# Patient Record
Sex: MALE | Race: WHITE | Employment: OTHER | URBAN - METROPOLITAN AREA
[De-identification: names, ages, dates, MRNs, and addresses within clinical notes are randomized per-mention and may not be internally consistent; named-entity substitution may affect disease eponyms.]

---

## 2022-09-06 ENCOUNTER — APPOINTMENT (OUTPATIENT)
Dept: GENERAL RADIOLOGY | Age: 82
End: 2022-09-06
Payer: OTHER GOVERNMENT

## 2022-09-06 ENCOUNTER — APPOINTMENT (OUTPATIENT)
Dept: CT IMAGING | Age: 82
End: 2022-09-06
Payer: OTHER GOVERNMENT

## 2022-09-06 ENCOUNTER — HOSPITAL ENCOUNTER (EMERGENCY)
Age: 82
Discharge: HOME | End: 2022-09-07
Attending: EMERGENCY MEDICINE
Payer: OTHER GOVERNMENT

## 2022-09-06 DIAGNOSIS — U07.1 COVID-19: ICD-10-CM

## 2022-09-06 DIAGNOSIS — R53.1 GENERALIZED WEAKNESS: Primary | ICD-10-CM

## 2022-09-06 LAB
ALBUMIN SERPL-MCNC: 3.5 G/DL (ref 3.5–5.2)
ALP BLD-CCNC: 77 U/L (ref 40–129)
ALT SERPL-CCNC: 11 U/L (ref 0–40)
ANION GAP SERPL CALCULATED.3IONS-SCNC: 11 MMOL/L (ref 7–16)
AST SERPL-CCNC: 24 U/L (ref 0–39)
BASOPHILS ABSOLUTE: 0.04 E9/L (ref 0–0.2)
BASOPHILS RELATIVE PERCENT: 0.8 % (ref 0–2)
BILIRUB SERPL-MCNC: 0.4 MG/DL (ref 0–1.2)
BUN BLDV-MCNC: 15 MG/DL (ref 6–23)
CALCIUM SERPL-MCNC: 9.1 MG/DL (ref 8.6–10.2)
CHLORIDE BLD-SCNC: 101 MMOL/L (ref 98–107)
CO2: 26 MMOL/L (ref 22–29)
CREAT SERPL-MCNC: 1.1 MG/DL (ref 0.7–1.2)
D DIMER: 256 NG/ML DDU
EOSINOPHILS ABSOLUTE: 0.01 E9/L (ref 0.05–0.5)
EOSINOPHILS RELATIVE PERCENT: 0.2 % (ref 0–6)
GFR AFRICAN AMERICAN: >60
GFR NON-AFRICAN AMERICAN: >60 ML/MIN/1.73
GLUCOSE BLD-MCNC: 125 MG/DL (ref 74–99)
HCT VFR BLD CALC: 49.3 % (ref 37–54)
HEMOGLOBIN: 16.5 G/DL (ref 12.5–16.5)
IMMATURE GRANULOCYTES #: 0.01 E9/L
IMMATURE GRANULOCYTES %: 0.2 % (ref 0–5)
LIPASE: 34 U/L (ref 13–60)
LYMPHOCYTES ABSOLUTE: 1.33 E9/L (ref 1.5–4)
LYMPHOCYTES RELATIVE PERCENT: 25.9 % (ref 20–42)
MCH RBC QN AUTO: 29.7 PG (ref 26–35)
MCHC RBC AUTO-ENTMCNC: 33.5 % (ref 32–34.5)
MCV RBC AUTO: 88.8 FL (ref 80–99.9)
MONOCYTES ABSOLUTE: 1 E9/L (ref 0.1–0.95)
MONOCYTES RELATIVE PERCENT: 19.5 % (ref 2–12)
NEUTROPHILS ABSOLUTE: 2.75 E9/L (ref 1.8–7.3)
NEUTROPHILS RELATIVE PERCENT: 53.4 % (ref 43–80)
PDW BLD-RTO: 14.7 FL (ref 11.5–15)
PLATELET # BLD: 129 E9/L (ref 130–450)
PMV BLD AUTO: 11.1 FL (ref 7–12)
POTASSIUM REFLEX MAGNESIUM: 3.8 MMOL/L (ref 3.5–5)
RBC # BLD: 5.55 E12/L (ref 3.8–5.8)
SARS-COV-2, NAAT: DETECTED
SODIUM BLD-SCNC: 138 MMOL/L (ref 132–146)
TOTAL PROTEIN: 6.7 G/DL (ref 6.4–8.3)
TROPONIN, HIGH SENSITIVITY: 16 NG/L (ref 0–11)
TROPONIN, HIGH SENSITIVITY: 17 NG/L (ref 0–11)
WBC # BLD: 5.1 E9/L (ref 4.5–11.5)

## 2022-09-06 PROCEDURE — 80053 COMPREHEN METABOLIC PANEL: CPT

## 2022-09-06 PROCEDURE — 96360 HYDRATION IV INFUSION INIT: CPT

## 2022-09-06 PROCEDURE — 99285 EMERGENCY DEPT VISIT HI MDM: CPT

## 2022-09-06 PROCEDURE — 2580000003 HC RX 258: Performed by: EMERGENCY MEDICINE

## 2022-09-06 PROCEDURE — 71045 X-RAY EXAM CHEST 1 VIEW: CPT

## 2022-09-06 PROCEDURE — 71275 CT ANGIOGRAPHY CHEST: CPT

## 2022-09-06 PROCEDURE — 87635 SARS-COV-2 COVID-19 AMP PRB: CPT

## 2022-09-06 PROCEDURE — 85378 FIBRIN DEGRADE SEMIQUANT: CPT

## 2022-09-06 PROCEDURE — 84484 ASSAY OF TROPONIN QUANT: CPT

## 2022-09-06 PROCEDURE — 93005 ELECTROCARDIOGRAM TRACING: CPT | Performed by: EMERGENCY MEDICINE

## 2022-09-06 PROCEDURE — 6360000004 HC RX CONTRAST MEDICATION: Performed by: RADIOLOGY

## 2022-09-06 PROCEDURE — 83690 ASSAY OF LIPASE: CPT

## 2022-09-06 PROCEDURE — 85025 COMPLETE CBC W/AUTO DIFF WBC: CPT

## 2022-09-06 PROCEDURE — 96361 HYDRATE IV INFUSION ADD-ON: CPT

## 2022-09-06 RX ORDER — CALCIUM CARBONATE 200(500)MG
500 TABLET,CHEWABLE ORAL 3 TIMES DAILY PRN
Status: DISCONTINUED | OUTPATIENT
Start: 2022-09-06 | End: 2022-09-07 | Stop reason: HOSPADM

## 2022-09-06 RX ORDER — 0.9 % SODIUM CHLORIDE 0.9 %
1000 INTRAVENOUS SOLUTION INTRAVENOUS ONCE
Status: COMPLETED | OUTPATIENT
Start: 2022-09-06 | End: 2022-09-06

## 2022-09-06 RX ADMIN — IOPAMIDOL 75 ML: 755 INJECTION, SOLUTION INTRAVENOUS at 22:29

## 2022-09-06 RX ADMIN — SODIUM CHLORIDE 1000 ML: 9 INJECTION, SOLUTION INTRAVENOUS at 21:11

## 2022-09-06 RX ADMIN — SODIUM CHLORIDE 1000 ML: 9 INJECTION, SOLUTION INTRAVENOUS at 22:56

## 2022-09-06 ASSESSMENT — PAIN - FUNCTIONAL ASSESSMENT: PAIN_FUNCTIONAL_ASSESSMENT: NONE - DENIES PAIN

## 2022-09-07 VITALS
DIASTOLIC BLOOD PRESSURE: 78 MMHG | WEIGHT: 212 LBS | HEART RATE: 72 BPM | HEIGHT: 71 IN | TEMPERATURE: 98.4 F | RESPIRATION RATE: 18 BRPM | OXYGEN SATURATION: 94 % | BODY MASS INDEX: 29.68 KG/M2 | SYSTOLIC BLOOD PRESSURE: 128 MMHG

## 2022-09-07 LAB
EKG ATRIAL RATE: 72 BPM
EKG P AXIS: 64 DEGREES
EKG P-R INTERVAL: 198 MS
EKG Q-T INTERVAL: 388 MS
EKG QRS DURATION: 78 MS
EKG QTC CALCULATION (BAZETT): 424 MS
EKG R AXIS: 33 DEGREES
EKG T AXIS: 52 DEGREES
EKG VENTRICULAR RATE: 72 BPM

## 2022-09-07 PROCEDURE — 6370000000 HC RX 637 (ALT 250 FOR IP): Performed by: EMERGENCY MEDICINE

## 2022-09-07 RX ORDER — ASCORBIC ACID 500 MG
500 TABLET ORAL 2 TIMES DAILY
Qty: 14 TABLET | Refills: 0 | Status: SHIPPED | OUTPATIENT
Start: 2022-09-07 | End: 2022-09-14

## 2022-09-07 RX ORDER — ZINC SULFATE 50(220)MG
50 CAPSULE ORAL DAILY
Qty: 7 CAPSULE | Refills: 0 | Status: SHIPPED | OUTPATIENT
Start: 2022-09-07 | End: 2022-09-14

## 2022-09-07 RX ORDER — PANTOPRAZOLE SODIUM 40 MG/1
40 TABLET, DELAYED RELEASE ORAL
Qty: 30 TABLET | Refills: 0 | Status: SHIPPED | OUTPATIENT
Start: 2022-09-07

## 2022-09-07 RX ORDER — DEXTROMETHORPHAN HYDROBROMIDE, GUAIFENESIN 5; 100 MG/5ML; MG/5ML
20 LIQUID ORAL EVERY 4 HOURS PRN
Qty: 118 ML | Refills: 0 | Status: SHIPPED | OUTPATIENT
Start: 2022-09-07 | End: 2022-09-14

## 2022-09-07 RX ADMIN — CALCIUM CARBONATE 500 MG: 500 TABLET, CHEWABLE ORAL at 00:08

## 2022-09-07 NOTE — ED PROVIDER NOTES
HPI:  9/6/22,   Time: 8:31 PM EDT       Catrachita Boles is a 78 y.o. male presenting to the ED for generalized weakness and fatigue, beginning 3 days ago. The complaint has been persistent, moderate in severity, and worsened by nothing. Patient states that since Sunday he has not been feeling well. He states he has been having generalized weakness body aches as well as feeling lightheaded and dizzy. He states that today took a COVID test that was positive. He states he has had increasing shortness of breath therefore he came to the ED to be evaluated. No chest pain. No nausea vomiting diarrhea. No numbness tingling. No focal deficits. No headaches or vision changes. Review of Systems:   Pertinent positives and negatives are stated within HPI, all other systems reviewed and are negative.          --------------------------------------------- PAST HISTORY ---------------------------------------------  Past Medical History:  has no past medical history on file. Past Surgical History:  has no past surgical history on file. Social History:      Family History: family history is not on file. The patients home medications have been reviewed. Allergies: Patient has no known allergies. ---------------------------------------------------PHYSICAL EXAM--------------------------------------    Constitutional/General: Alert and oriented x3, well appearing, non toxic in NAD  Head: Normocephalic and atraumatic  Eyes: PERRL, EOMI, conjunctive normal, sclera non icteric  Mouth: Oropharynx clear, handling secretions, no trismus, no asymmetry of the posterior oropharynx or uvular edema  Neck: Supple, full ROM, non tender to palpation in the midline, no stridor, no crepitus, no meningeal signs  Respiratory: Lungs clear to auscultation bilaterally, no wheezes, rales, or rhonchi. Not in respiratory distress  Cardiovascular:  Regular rate. Regular rhythm. No murmurs, gallops, or rubs.  2+ distal pulses  GI: Abdomen Soft, Non tender, Non distended. +BS. No organomegaly, no palpable masses,  No rebound, guarding, or rigidity. Musculoskeletal: Moves all extremities x 4. Warm and well perfused, no clubbing, cyanosis, or edema. Capillary refill <3 seconds  Integument: skin warm and dry. No rashes. Neurologic: GCS 15, no focal deficits, symmetric strength 5/5 in the upper and lower extremities bilaterally, sensation intact all 4 extremities, cranial nerves II to XII intact, no ataxia  Psychiatric: Normal Affect    -------------------------------------------------- RESULTS -------------------------------------------------  I have personally reviewed all laboratory and imaging results for this patient. Results are listed below.      LABS:  Results for orders placed or performed during the hospital encounter of 09/06/22   COVID-19, Rapid    Specimen: Nasopharyngeal Swab   Result Value Ref Range    SARS-CoV-2, NAAT DETECTED (A) Not Detected   CBC with Auto Differential   Result Value Ref Range    WBC 5.1 4.5 - 11.5 E9/L    RBC 5.55 3.80 - 5.80 E12/L    Hemoglobin 16.5 12.5 - 16.5 g/dL    Hematocrit 49.3 37.0 - 54.0 %    MCV 88.8 80.0 - 99.9 fL    MCH 29.7 26.0 - 35.0 pg    MCHC 33.5 32.0 - 34.5 %    RDW 14.7 11.5 - 15.0 fL    Platelets 949 (L) 123 - 450 E9/L    MPV 11.1 7.0 - 12.0 fL    Neutrophils % 53.4 43.0 - 80.0 %    Immature Granulocytes % 0.2 0.0 - 5.0 %    Lymphocytes % 25.9 20.0 - 42.0 %    Monocytes % 19.5 (H) 2.0 - 12.0 %    Eosinophils % 0.2 0.0 - 6.0 %    Basophils % 0.8 0.0 - 2.0 %    Neutrophils Absolute 2.75 1.80 - 7.30 E9/L    Immature Granulocytes # 0.01 E9/L    Lymphocytes Absolute 1.33 (L) 1.50 - 4.00 E9/L    Monocytes Absolute 1.00 (H) 0.10 - 0.95 E9/L    Eosinophils Absolute 0.01 (L) 0.05 - 0.50 E9/L    Basophils Absolute 0.04 0.00 - 0.20 E9/L   Comprehensive Metabolic Panel w/ Reflex to MG   Result Value Ref Range    Sodium 138 132 - 146 mmol/L    Potassium reflex Magnesium 3.8 3.5 - 5.0 mmol/L Chloride 101 98 - 107 mmol/L    CO2 26 22 - 29 mmol/L    Anion Gap 11 7 - 16 mmol/L    Glucose 125 (H) 74 - 99 mg/dL    BUN 15 6 - 23 mg/dL    Creatinine 1.1 0.7 - 1.2 mg/dL    GFR Non-African American >60 >=60 mL/min/1.73    GFR African American >60     Calcium 9.1 8.6 - 10.2 mg/dL    Total Protein 6.7 6.4 - 8.3 g/dL    Albumin 3.5 3.5 - 5.2 g/dL    Total Bilirubin 0.4 0.0 - 1.2 mg/dL    Alkaline Phosphatase 77 40 - 129 U/L    ALT 11 0 - 40 U/L    AST 24 0 - 39 U/L   Lipase   Result Value Ref Range    Lipase 34 13 - 60 U/L   Troponin   Result Value Ref Range    Troponin, High Sensitivity 17 (H) 0 - 11 ng/L   D-Dimer, Quantitative   Result Value Ref Range    D-Dimer, Quant 256 ng/mL DDU   Troponin   Result Value Ref Range    Troponin, High Sensitivity 16 (H) 0 - 11 ng/L   EKG 12 Lead   Result Value Ref Range    Ventricular Rate 72 BPM    Atrial Rate 72 BPM    P-R Interval 198 ms    QRS Duration 78 ms    Q-T Interval 388 ms    QTc Calculation (Bazett) 424 ms    P Axis 64 degrees    R Axis 33 degrees    T Axis 52 degrees       RADIOLOGY:  Interpreted by Radiologist.  CTA PULMONARY W CONTRAST   Final Result   No evidence of pulmonary embolism or acute pulmonary abnormality. Large hiatal hernia extending into the left hemithorax. RECOMMENDATIONS:   Unavailable         XR CHEST PORTABLE   Final Result   No acute process. EKG:  This EKG is signed and interpreted by the EP. EKG shows sinus rhythm with frequent PVCs 72 bpm.  Low voltage QRS. Normal axis. Nonspecific ST-T wave changes. No STEMI.    ------------------------- NURSING NOTES AND VITALS REVIEWED ---------------------------   The nursing notes within the ED encounter and vital signs as below have been reviewed by myself.   /80   Pulse 74   Temp 98.4 °F (36.9 °C) (Oral)   Resp 16   Ht 5' 11\" (1.803 m)   Wt 212 lb (96.2 kg)   SpO2 95%   BMI 29.57 kg/m²   Oxygen Saturation Interpretation: Normal    The patients available past medical records and past encounters were reviewed. ------------------------------ ED COURSE/MEDICAL DECISION MAKING----------------------  Medications   calcium carbonate (TUMS) chewable tablet 500 mg (500 mg Oral Given 9/7/22 0008)   0.9 % sodium chloride bolus (0 mLs IntraVENous Stopped 9/6/22 2211)   iopamidol (ISOVUE-370) 76 % injection 75 mL (75 mLs IntraVENous Given 9/6/22 2229)   0.9 % sodium chloride bolus (0 mLs IntraVENous Stopped 9/6/22 2354)         ED COURSE:       Medical Decision Making: This is an 33-year-old male generalized weakness and fatigue. Patient underwent laboratory work-up which showed normal CBC. Normal chemistry. Normal lipase. Troponins were 17 and 16 negative delta. EKG showed nonspecific findings and PVCs noted. COVID test was positive. Patient did have a mild elevation in D-dimer therefore patient underwent CTA. CTA showed no PE. Hiatal hernia noted. Patient made aware of CT findings and incidental findings. Patient given 2 L of IV fluids and on reevaluation patient feeling much better. Vital signs are stable. No hypoxia. Patient ambulated with no hypoxia. Patient GCS 15. Neurologically intact. Appears well. Patient stable for outpatient management. Return precautions given. Patient agrees with plan. I, Dr. Robert Silva, am the primary provider for this encounter    This patient's ED course included: a personal history and physicial examination, re-evaluation prior to disposition, multiple bedside re-evaluations, IV medications, cardiac monitoring, and continuous pulse oximetry    This patient has remained hemodynamically stable during their ED course. Re-Evaluations:             Re-evaluation. Patients symptoms are improving      Counseling: The emergency provider has spoken with the patient and discussed todays results, in addition to providing specific details for the plan of care and counseling regarding the diagnosis and prognosis. Questions are answered at this time and they are agreeable with the plan.       --------------------------------- IMPRESSION AND DISPOSITION ---------------------------------    IMPRESSION  1. Generalized weakness    2. COVID-19        DISPOSITION  Disposition: Discharge to home  Patient condition is stable    NOTE: This report was transcribed using voice recognition software.  Every effort was made to ensure accuracy; however, inadvertent computerized transcription errors may be present        Stephan rPice DO  09/07/22 [id number]

## 2022-09-08 ENCOUNTER — CARE COORDINATION (OUTPATIENT)
Dept: CARE COORDINATION | Age: 82
End: 2022-09-08

## 2022-09-08 NOTE — CARE COORDINATION
Patient contacted regarding COVID-19 diagnosis and pulse oximeter ordered at discharge. Discussed COVID-19 related testing which was available at this time. Test results were positive. Patient informed of results, if available? Yes. Ambulatory Care Manager contacted the patient by telephone to perform post discharge assessment. Call within 2 business days of discharge: Yes. Verified name and  with patient as identifiers. Provided introduction to self, and explanation of the CTN/ACM role, and reason for call due to risk factors for infection and/or exposure to COVID-19. Symptoms reviewed with patient who verbalized the following symptoms: fatigue  cough  no new symptoms  no worsening symptoms. Due to no new or worsening symptoms encounter was not routed to provider for escalation. Discussed follow-up appointments. If no appointment was previously scheduled, appointment scheduling offered: Yes. [facility] follow up appointment(s): No future appointments. Non-[facility] follow up appointment(s): Patient states that he will follow up with his provider at [facility]. Non-face-to-face services provided:  Obtained and reviewed discharge summary and/or continuity of care documents     Advance Care Planning:   Does patient have an Advance Directive:  reviewed and current. Educated patient about risk for severe COVID-19 due to risk factors according to CDC guidelines. ACM reviewed discharge instructions, medical action plan and red flag symptoms with the patient who verbalized understanding. Discussed COVID vaccination status: Yes. Education provided on COVID-19 vaccination as appropriate. Discussed exposure protocols and quarantine with CDC Guidelines. Patient was given an opportunity to verbalize any questions and concerns and agrees to contact ACM or health care provider for questions related to their healthcare.     Reviewed and educated patient on any new and changed medications related to discharge diagnosis Was patient discharged with a pulse oximeter? yes, discussed and confirmed pulse oximeter discharge instructions and when to notify provider or seek emergency care. Patient verbalizes understanding on how to properly use the pulse oximeter. Patient took his pulse ox during this phone encounter and reports it to be 96% on room air.    -Patient denies worsening of any symptoms.  -Patient verbalizes understanding of when it would be of emergent need to return to the ED. -Reviewed medications ordered by the ED provider. -ACM emphasized the importance to follow up with PCP. -Patient states that receives care at the [address] [place] [address] and will call to schedule a follow up appointment.  -Patient verbalized understanding of the CDC guidelines.  -Patient declined activation of MyChart. He does not use a computer.  -All questions were answered during this encounter. ACM provided contact information. Plan for follow-up call in 5-7 days based on severity of symptoms and risk factors.

## 2022-09-12 ENCOUNTER — CARE COORDINATION (OUTPATIENT)
Dept: CARE COORDINATION | Age: 82
End: 2022-09-12

## 2022-09-12 NOTE — CARE COORDINATION
Patient contacted regarding COVID-19 diagnosis and pulse oximeter ordered at discharge. Discussed COVID-19 related testing which was available at this time. Test results were positive. Patient informed of results, if available? Yes. Ambulatory Care Manager contacted the patient by telephone to perform post discharge assessment. Call within 2 business days of discharge: Yes. Verified name and  with patient as identifiers. Provided introduction to self, and explanation of the CTN/ACM role, and reason for call due to risk factors for infection and/or exposure to COVID-19. Symptoms reviewed with patient who verbalized the following symptoms: fatigue  pain or aching joints  cough. Due to no new or worsening symptoms encounter was not routed to provider for escalation. Discussed follow-up appointments. If no appointment was previously scheduled, appointment scheduling offered: Yes. [facility] follow up appointment(s): Pt will follow up when applicable  Non-[facility] follow up appointment(s): NA    Non-face-to-face services provided:  Obtained and reviewed discharge summary and/or continuity of care documents     Advance Care Planning:   Does patient have an Advance Directive:  reviewed and current. Educated patient about risk for severe COVID-19 due to risk factors according to CDC guidelines. ACM reviewed discharge instructions, medical action plan and red flag symptoms with the patient who verbalized understanding. Discussed COVID vaccination status: Yes. Education provided on COVID-19 vaccination as appropriate. Discussed exposure protocols and quarantine with CDC Guidelines. Patient was given an opportunity to verbalize any questions and concerns and agrees to contact ACM or health care provider for questions related to their healthcare.     Reviewed and educated patient on any new and changed medications related to discharge diagnosis     Was patient discharged with a pulse oximeter? yes, discussed and confirmed pulse oximeter discharge instructions and when to notify provider or seek emergency care. ACM provided contact information. No further follow-up call identified based on severity of symptoms and risk factors. Patient understands CDC guidelines and is able to repeat them. Patient verbalizes understanding of suggestions for follow up from ED AVS and has number to do so  Patient has no new or worsening symptoms. Patient wishes no further calls at this time from [facility]. Patient has contact information and encouraged to contact ACM should there be any questions or concerns. Episode to be closed at this time.

## 2023-03-07 ENCOUNTER — HOSPITAL ENCOUNTER (EMERGENCY)
Age: 83
Discharge: HOME | End: 2023-03-07
Attending: EMERGENCY MEDICINE
Payer: OTHER GOVERNMENT

## 2023-03-07 ENCOUNTER — HOSPITAL ENCOUNTER (OUTPATIENT)
Dept: ULTRASOUND IMAGING | Age: 83
Discharge: HOME | End: 2023-03-07
Payer: OTHER GOVERNMENT

## 2023-03-07 ENCOUNTER — HOSPITAL ENCOUNTER (OUTPATIENT)
Age: 83
Discharge: HOME | End: 2023-03-07
Payer: OTHER GOVERNMENT

## 2023-03-07 VITALS
RESPIRATION RATE: 18 BRPM | OXYGEN SATURATION: 97 % | SYSTOLIC BLOOD PRESSURE: 130 MMHG | DIASTOLIC BLOOD PRESSURE: 76 MMHG | HEART RATE: 78 BPM | TEMPERATURE: 97.3 F

## 2023-03-07 DIAGNOSIS — I82.4Y9 DEEP VEIN THROMBOSIS (DVT) OF PROXIMAL LOWER EXTREMITY, UNSPECIFIED CHRONICITY, UNSPECIFIED LATERALITY (HCC): ICD-10-CM

## 2023-03-07 DIAGNOSIS — I82.4Y1 DEEP VEIN THROMBOSIS (DVT) OF PROXIMAL VEIN OF RIGHT LOWER EXTREMITY, UNSPECIFIED CHRONICITY (HCC): Primary | ICD-10-CM

## 2023-03-07 DIAGNOSIS — R60.9 SWELLING: ICD-10-CM

## 2023-03-07 LAB
ALBUMIN SERPL-MCNC: 4.5 G/DL (ref 3.5–5.2)
ALP BLD-CCNC: 93 U/L (ref 40–129)
ALT SERPL-CCNC: 10 U/L (ref 0–40)
ANION GAP SERPL CALCULATED.3IONS-SCNC: 15 MMOL/L (ref 7–16)
APTT: 37 SEC (ref 24.5–35.1)
AST SERPL-CCNC: 17 U/L (ref 0–39)
BASOPHILS ABSOLUTE: 0.09 E9/L (ref 0–0.2)
BASOPHILS RELATIVE PERCENT: 1.1 % (ref 0–2)
BILIRUB SERPL-MCNC: 1 MG/DL (ref 0–1.2)
BUN BLDV-MCNC: 12 MG/DL (ref 6–23)
CALCIUM SERPL-MCNC: 10.2 MG/DL (ref 8.6–10.2)
CHLORIDE BLD-SCNC: 102 MMOL/L (ref 98–107)
CO2: 23 MMOL/L (ref 22–29)
CREAT SERPL-MCNC: 1 MG/DL (ref 0.7–1.2)
EOSINOPHILS ABSOLUTE: 0.09 E9/L (ref 0.05–0.5)
EOSINOPHILS RELATIVE PERCENT: 1.1 % (ref 0–6)
GFR SERPL CREATININE-BSD FRML MDRD: >60 ML/MIN/1.73
GLUCOSE BLD-MCNC: 94 MG/DL (ref 74–99)
HCT VFR BLD CALC: 52.6 % (ref 37–54)
HEMOGLOBIN: 17.8 G/DL (ref 12.5–16.5)
IMMATURE GRANULOCYTES #: 0.02 E9/L
IMMATURE GRANULOCYTES %: 0.2 % (ref 0–5)
INR BLD: 1
LYMPHOCYTES ABSOLUTE: 1.68 E9/L (ref 1.5–4)
LYMPHOCYTES RELATIVE PERCENT: 20 % (ref 20–42)
MCH RBC QN AUTO: 29.8 PG (ref 26–35)
MCHC RBC AUTO-ENTMCNC: 33.8 % (ref 32–34.5)
MCV RBC AUTO: 88 FL (ref 80–99.9)
MONOCYTES ABSOLUTE: 0.74 E9/L (ref 0.1–0.95)
MONOCYTES RELATIVE PERCENT: 8.8 % (ref 2–12)
NEUTROPHILS ABSOLUTE: 5.79 E9/L (ref 1.8–7.3)
NEUTROPHILS RELATIVE PERCENT: 68.8 % (ref 43–80)
PDW BLD-RTO: 18.1 FL (ref 11.5–15)
PLATELET # BLD: 189 E9/L (ref 130–450)
PMV BLD AUTO: 11.2 FL (ref 7–12)
POTASSIUM SERPL-SCNC: 4.1 MMOL/L (ref 3.5–5)
PROTHROMBIN TIME: 12 SEC (ref 9.3–12.4)
RBC # BLD: 5.98 E12/L (ref 3.8–5.8)
SODIUM BLD-SCNC: 140 MMOL/L (ref 132–146)
TOTAL PROTEIN: 8 G/DL (ref 6.4–8.3)
WBC # BLD: 8.4 E9/L (ref 4.5–11.5)

## 2023-03-07 PROCEDURE — 99283 EMERGENCY DEPT VISIT LOW MDM: CPT

## 2023-03-07 PROCEDURE — 85610 PROTHROMBIN TIME: CPT

## 2023-03-07 PROCEDURE — 80053 COMPREHEN METABOLIC PANEL: CPT

## 2023-03-07 PROCEDURE — 85025 COMPLETE CBC W/AUTO DIFF WBC: CPT

## 2023-03-07 PROCEDURE — 6370000000 HC RX 637 (ALT 250 FOR IP)

## 2023-03-07 PROCEDURE — 85730 THROMBOPLASTIN TIME PARTIAL: CPT

## 2023-03-07 PROCEDURE — 93971 EXTREMITY STUDY: CPT

## 2023-03-07 RX ADMIN — APIXABAN 10 MG: 5 TABLET, FILM COATED ORAL at 16:44

## 2023-03-07 ASSESSMENT — ENCOUNTER SYMPTOMS
COUGH: 0
SINUS PRESSURE: 0
BLOOD IN STOOL: 0
BACK PAIN: 0
SORE THROAT: 0
EYE DISCHARGE: 0
CONSTIPATION: 0
NAUSEA: 0
VOMITING: 0
DIARRHEA: 0
PHOTOPHOBIA: 0
ABDOMINAL DISTENTION: 0
EYE REDNESS: 0
WHEEZING: 0
RHINORRHEA: 0
SHORTNESS OF BREATH: 0
ABDOMINAL PAIN: 0

## 2023-03-07 ASSESSMENT — PAIN - FUNCTIONAL ASSESSMENT: PAIN_FUNCTIONAL_ASSESSMENT: NONE - DENIES PAIN

## 2023-03-07 NOTE — DISCHARGE INSTRUCTIONS
As we discussed, please take your new medication as it is prescribed. For the next 7 days you will take 2 tablets in the morning and 2 tablets in the evening. Starting Wednesday 3/15/ morning you will start taking 1 tablet in the morning and 1 tablet in the evening. As we discussed it is important that you follow-up with your [address] [place] [address] doctor to follow-up on your recent emergency room visit and to discuss your new medication for further prescriptions. Reasons to return the emergency room would be worsening swelling, severe chest pain, severe shortness of breath, dizziness, head injury, uncontrolled bleeding, or uncontrolled fevers.

## 2023-03-07 NOTE — ED PROVIDER NOTES
[address] [facility]  Department of Emergency Medicine     Written by: Cory Quintero MD  Patient Name: Brenda Hameed  Attending Provider: Micheline Lang DO  Admit Date: 3/7/2023  3:02 PM  MRN: [id number]                   : 1940      Chief Complaint   Patient presents with    Leg Swelling     Pt presents today for right leg swelling. Pt brought over from [address] [facility]. DVT noted in scan. - Chief complaint    HPI   The patient is an 27-year-old male with a past medical history presenting with right lower extremity swelling. Patient states that he has been having right lower extremity swelling for 1 week. He states that socks and pants have felt tighter over his right lower extremity for the same duration. He was evaluated by ultrasound who performed right lower extremity ultrasound which showed evidence of a nonocclusive clot in his right lower extremity and was sent to the emergency room. Patient states that he is unaware of any clotting disorders and has never had any prior blood clots. Review of Systems   Constitutional:  Negative for activity change, chills, fatigue and fever. HENT:  Negative for congestion, postnasal drip, rhinorrhea, sinus pressure and sore throat. Eyes:  Negative for photophobia, discharge, redness and visual disturbance. Respiratory:  Negative for cough, shortness of breath and wheezing. Cardiovascular:  Positive for leg swelling. Negative for chest pain and palpitations. Gastrointestinal:  Negative for abdominal distention, abdominal pain, blood in stool, constipation, diarrhea, nausea and vomiting. Endocrine: Negative for cold intolerance and heat intolerance. Genitourinary:  Negative for decreased urine volume, difficulty urinating, dysuria, flank pain, frequency, hematuria and urgency. Musculoskeletal:  Negative for arthralgias, back pain, joint swelling and myalgias. Skin:  Negative for rash and wound.    Allergic/Immunologic: Negative for immunocompromised state. Neurological:  Negative for dizziness, syncope, facial asymmetry, weakness, light-headedness, numbness and headaches. Hematological:  Does not bruise/bleed easily. Psychiatric/Behavioral:  Negative for behavioral problems and hallucinations. Physical Exam  Constitutional:       General: He is not in acute distress. Appearance: Normal appearance. He is not ill-appearing, toxic-appearing or diaphoretic. HENT:      Head: Normocephalic and atraumatic. Right Ear: Hearing normal.      Left Ear: Hearing normal.      Nose: Nose normal.      Mouth/Throat:      Lips: Pink. Mouth: Mucous membranes are moist.      Pharynx: Oropharynx is clear. Eyes:      Extraocular Movements: Extraocular movements intact. Conjunctiva/sclera: Conjunctivae normal.      Pupils: Pupils are equal, round, and reactive to light. Cardiovascular:      Rate and Rhythm: Normal rate and regular rhythm. Pulses: Normal pulses. Radial pulses are 2+ on the right side and 2+ on the left side. Posterior tibial pulses are 2+ on the right side and 2+ on the left side. Heart sounds: S1 normal and S2 normal.   Pulmonary:      Effort: Pulmonary effort is normal. No tachypnea, accessory muscle usage or respiratory distress. Breath sounds: Normal breath sounds and air entry. No decreased breath sounds, wheezing, rhonchi or rales. Abdominal:      General: Abdomen is flat. Bowel sounds are normal. There is no distension. Palpations: Abdomen is soft. There is no fluid wave or mass. Tenderness: There is no abdominal tenderness. There is no right CVA tenderness, left CVA tenderness or guarding. Musculoskeletal:         General: No swelling or tenderness. Normal range of motion. Cervical back: Normal range of motion and neck supple. No rigidity. Right lower leg: 3+ Pitting Edema present. Left lower leg: No edema.    Lymphadenopathy:      Cervical: No cervical adenopathy. Skin:     General: Skin is warm and dry. Capillary Refill: Capillary refill takes less than 2 seconds. Findings: No bruising, lesion or rash. Neurological:      General: No focal deficit present. Mental Status: He is alert and oriented to person, place, and time. Mental status is at baseline. Motor: No weakness. Psychiatric:         Attention and Perception: Attention normal.         Mood and Affect: Mood and affect normal.         Behavior: Behavior is cooperative. Procedures       MDM  Number of Diagnoses or Management Options  Deep vein thrombosis (DVT) of proximal vein of right lower extremity, unspecified chronicity ([facility] [address]  Diagnosis management comments: The patient is an 40-year-old male with a past medical history presenting with right lower extremity swelling. At the time of initial evaluation, the patient is hemodynamically stable. Differential diagnosis includes DVT with possible clotting disorder or anemia. CBC was ordered to evaluate for infectious processes, signs of stress, anemia, or thrombocytopenia. BMP/CMP/LFTs was/were ordered to evaluate for electrolyte abnormalities and to evaluate kidney/liver function. Coagulation studies were ordered to evaluate for coagulopathies. CBC does not show anemia. CMP shows regular kidney function with GFR greater than 60. INR is reassuring. APTT is mildly elevated. Given reassuring labs, patient is okay to start on Eliquis. Patient was educated about Eliquis as well as return precautions and demonstrated good understanding. Patient was given his first dose in the emergency room. At the time of discharge, the patient demonstrated good understanding of his condition, no questions, and was hemodynamically stable.              ED Course as of 03/09/23 1310   Tue Mar 07, 2023   [id number]   ATTENDING PROVIDER ATTESTATION:     I have personally performed and/or participated in the history, exam, medical decision making, and procedures and agree with all pertinent clinical information unless otherwise noted. I have also reviewed and agree with the past medical, family and social history unless otherwise noted. I have discussed this patient in detail with the resident and provided the instruction and education regarding the evidence-based evaluation and treatment of lower extremity edema. Any EKG that may have been performed has been personally reviewed by me and I agree with the documentation as noted by the resident. History: Patient presents to the ED for evaluation of right lower extremity swelling. Has not noticed much pain. Had outpatient ultrasound today which showed a partially occlusive DVT. He reports occasional dyspnea with exertion. No prior history of DVT. Denies chest pain. No fever or chills. My findings: Cash Marquez is a 78 y.o. male whom is in no distress. Physical exam reveals patient lying in bed comfortably. There is significant edema of the right lower extremity as compared to left. There is also increased warmth to palpation. No significant tenderness. Heart regular rhythm. Lungs clear to auscultation. No accessory muscle use or conversational dyspnea. My plan: Symptomatic and supportive care. Appropriate labs, and start appropriate anticoagulants. Electronically signed by Aung Hernandez DO on 3/7/23 at 3:35 PM EST       [MS]      ED Course User Index  [MS] Aung Hernandez DO       --------------------------------------------- PAST HISTORY ---------------------------------------------  Past Medical History:  has no past medical history on file. Past Surgical History:  has no past surgical history on file. Social History:      Family History: family history is not on file. The patients home medications have been reviewed. Allergies: Patient has no known allergies.     -------------------------------------------------- RESULTS -------------------------------------------------  Labs:  Results for orders placed or performed during the hospital encounter of 03/07/23   CBC with Auto Differential   Result Value Ref Range    WBC 8.4 4.5 - 11.5 E9/L    RBC 5.98 (H) 3.80 - 5.80 E12/L    Hemoglobin 17.8 (H) 12.5 - 16.5 g/dL    Hematocrit 52.6 37.0 - 54.0 %    MCV 88.0 80.0 - 99.9 fL    MCH 29.8 26.0 - 35.0 pg    MCHC 33.8 32.0 - 34.5 %    RDW 18.1 (H) 11.5 - 15.0 fL    Platelets 841 115 - 079 E9/L    MPV 11.2 7.0 - 12.0 fL    Neutrophils % 68.8 43.0 - 80.0 %    Immature Granulocytes % 0.2 0.0 - 5.0 %    Lymphocytes % 20.0 20.0 - 42.0 %    Monocytes % 8.8 2.0 - 12.0 %    Eosinophils % 1.1 0.0 - 6.0 %    Basophils % 1.1 0.0 - 2.0 %    Neutrophils Absolute 5.79 1.80 - 7.30 E9/L    Immature Granulocytes # 0.02 E9/L    Lymphocytes Absolute 1.68 1.50 - 4.00 E9/L    Monocytes Absolute 0.74 0.10 - 0.95 E9/L    Eosinophils Absolute 0.09 0.05 - 0.50 E9/L    Basophils Absolute 0.09 0.00 - 0.20 E9/L   Protime-INR   Result Value Ref Range    Protime 12.0 9.3 - 12.4 sec    INR 1.0    APTT   Result Value Ref Range    aPTT 37.0 (H) 24.5 - 35.1 sec   CMP   Result Value Ref Range    Sodium 140 132 - 146 mmol/L    Potassium 4.1 3.5 - 5.0 mmol/L    Chloride 102 98 - 107 mmol/L    CO2 23 22 - 29 mmol/L    Anion Gap 15 7 - 16 mmol/L    Glucose 94 74 - 99 mg/dL    BUN 12 6 - 23 mg/dL    Creatinine 1.0 0.7 - 1.2 mg/dL    Est, Glom Filt Rate >60 >=60 mL/min/1.73    Calcium 10.2 8.6 - 10.2 mg/dL    Total Protein 8.0 6.4 - 8.3 g/dL    Albumin 4.5 3.5 - 5.2 g/dL    Total Bilirubin 1.0 0.0 - 1.2 mg/dL    Alkaline Phosphatase 93 40 - 129 U/L    ALT 10 0 - 40 U/L    AST 17 0 - 39 U/L       Radiology:  No orders to display       ------------------------- NURSING NOTES AND VITALS REVIEWED ---------------------------  Date / Time Roomed:  3/7/2023  3:02 PM  ED Bed Assignment:  MILAGROS/MILAGROS    The nursing notes within the ED encounter and vital signs as below have been reviewed.    BP 130/76   Pulse 78   Temp 97.3 °F (36.3 °C) (Infrared)   Resp 18   SpO2 97%   Oxygen Saturation Interpretation: Normal      ------------------------------------------ PROGRESS NOTES ------------------------------------------  I have spoken with the patient and discussed todays results, in addition to providing specific details for the plan of care and counseling regarding the diagnosis and prognosis. Their questions are answered at this time and they are agreeable with the plan. I discussed at length with them reasons for immediate return here for re evaluation. They will followup with primary care by calling their office tomorrow. --------------------------------- ADDITIONAL PROVIDER NOTES ---------------------------------  At this time the patient is without objective evidence of an acute process requiring hospitalization or inpatient management. They have remained hemodynamically stable throughout their entire ED visit and are stable for discharge with outpatient follow-up. The plan has been discussed in detail and they are aware of the specific conditions for emergent return, as well as the importance of follow-up. Discharge Medication List as of 3/7/2023  4:57 PM        START taking these medications    Details   apixaban starter pack (ELIQUIS DVT/PE STARTER PACK) 5 MG TBPK tablet Take 1 tablet by mouth See Admin Instructions, Disp-74 tablet, R-[id number]             Diagnosis:  1. Deep vein thrombosis (DVT) of proximal vein of right lower extremity, unspecified chronicity ([facility] [address]        Disposition:  Patient's disposition: Discharge to home  Patient's condition is stable. Patient was seen and evaluated by myself and my attending Kaye Veloz DO. Assessment and Plan discussed with attending provider, please see attestation for final plan of care.      MD Natasha Ordoñez MD  Resident  03/09/23 [id number]